# Patient Record
(demographics unavailable — no encounter records)

---

## 2024-12-09 NOTE — HISTORY OF PRESENT ILLNESS
[FreeTextEntry1] : Patient is a 66-year-old gentleman with past medical history significant for hypertension presenting to us for evaluation of bilateral lower extremity discomfort.  Patient denies any history of diabetes coronary artery disease or smoking.  Patient complains of bilateral toes feeling cold and frozen and then subsequently feeling hot.  Symptoms are worse at night.  They are not related to temperature change as far as he knows.  No history of tissue loss.  No history of DVT.

## 2024-12-09 NOTE — PHYSICAL EXAM
[JVD] : no jugular venous distention  [Normal Heart Sounds] : normal heart sounds [2+] : left 2+ [Ankle Swelling (On Exam)] : not present [Abdomen Masses] : No abdominal masses

## 2024-12-09 NOTE — ASSESSMENT
[FreeTextEntry1] : Patient with bilateral lower extremity discomfort involving his toes.  Based on clinical examination and strongly palpable pulses bilaterally along with duplex which was reviewed done in the hospital, I do not suspect any significant arterial insufficiency leading to the symptoms.  Will arrange for the patient to undergo PVRs with toe pressures to confirm.  I suspect neuropathy.  Recommend neurology evaluation.  The other possibility is Raynaud's syndrome and therefore the patient may benefit from a rheumatological workup.  This was all discussed with the patient.  We will arrange for him to undergo the PVRs at our office in the near future.

## 2024-12-09 NOTE — CONSULT LETTER
[Dear  ___] : Dear  [unfilled], [Consult Letter:] : I had the pleasure of evaluating your patient, [unfilled]. [Please see my note below.] : Please see my note below. [Consult Closing:] : Thank you very much for allowing me to participate in the care of this patient.  If you have any questions, please do not hesitate to contact me. [Sincerely,] : Sincerely, [FreeTextEntry3] : Lex Caicedo M.D., F.KURTIS., R.P.YESICAI.  of Vascular Surgery Assistant Professor of Radiology Director of Endovascular Program/ [facility]